# Patient Record
Sex: MALE | Race: ASIAN | NOT HISPANIC OR LATINO | Employment: STUDENT | URBAN - METROPOLITAN AREA
[De-identification: names, ages, dates, MRNs, and addresses within clinical notes are randomized per-mention and may not be internally consistent; named-entity substitution may affect disease eponyms.]

---

## 2024-03-29 ENCOUNTER — OFFICE VISIT (OUTPATIENT)
Dept: URGENT CARE | Facility: PHYSICIAN GROUP | Age: 19
End: 2024-03-29
Payer: COMMERCIAL

## 2024-03-29 ENCOUNTER — APPOINTMENT (OUTPATIENT)
Dept: URGENT CARE | Facility: PHYSICIAN GROUP | Age: 19
End: 2024-03-29

## 2024-03-29 VITALS
WEIGHT: 146.16 LBS | HEIGHT: 71 IN | OXYGEN SATURATION: 97 % | HEART RATE: 85 BPM | BODY MASS INDEX: 20.46 KG/M2 | DIASTOLIC BLOOD PRESSURE: 74 MMHG | TEMPERATURE: 99.6 F | RESPIRATION RATE: 16 BRPM | SYSTOLIC BLOOD PRESSURE: 106 MMHG

## 2024-03-29 DIAGNOSIS — J06.9 UPPER RESPIRATORY TRACT INFECTION, UNSPECIFIED TYPE: ICD-10-CM

## 2024-03-29 RX ORDER — ESCITALOPRAM OXALATE 10 MG/1
10 TABLET ORAL DAILY
COMMUNITY
Start: 2024-03-07

## 2024-03-29 RX ORDER — ONDANSETRON 4 MG/1
4 TABLET, ORALLY DISINTEGRATING ORAL EVERY 6 HOURS PRN
Qty: 15 TABLET | Refills: 0 | Status: SHIPPED | OUTPATIENT
Start: 2024-03-29

## 2024-03-29 ASSESSMENT — ENCOUNTER SYMPTOMS
SHORTNESS OF BREATH: 1
MYALGIAS: 0
FEVER: 0
SORE THROAT: 1
VOMITING: 0
DIARRHEA: 0
SINUS PAIN: 0
NAUSEA: 1
COUGH: 1
ABDOMINAL PAIN: 0
CHILLS: 0
WHEEZING: 0
HEADACHES: 1
SPUTUM PRODUCTION: 1

## 2024-03-29 NOTE — LETTER
[facility] U[facility] [address] [address]     March 29, 2024    Patient: Clyde Mcdowell   YOB: 2005   Date of Visit: 3/29/2024       To Whom It May Concern:    Clyde Mcdowell was seen and treated in our department on 3/29/2024. Please excuse Patient for recent illness that began 3/25/2024. Patient able to return to work if symptoms are improving and no fevers for 24 hours.     Sincerely,     GIANNA Plata.

## 2024-03-29 NOTE — PROGRESS NOTES
"Subjective:   Clyde Mcdowell is a 18 y.o. male who presents for Headache (Fever ,congestion ,sore throat ,x 4 days, need work note )      URI   This is a new problem. Episode onset: x4 days. The problem has been gradually improving. There has been no fever. Associated symptoms include congestion, coughing, headaches, nausea and a sore throat. Pertinent negatives include no abdominal pain, chest pain, diarrhea, dysuria, ear pain, rash, sinus pain, vomiting or wheezing.       Review of Systems   Constitutional:  Negative for chills, fever and malaise/fatigue.   HENT:  Positive for congestion and sore throat. Negative for ear pain, hearing loss and sinus pain.    Respiratory:  Positive for cough, sputum production and shortness of breath. Negative for wheezing.    Cardiovascular:  Negative for chest pain.   Gastrointestinal:  Positive for nausea. Negative for abdominal pain, diarrhea and vomiting.   Genitourinary:  Negative for dysuria.   Musculoskeletal:  Negative for myalgias.   Skin:  Negative for rash.   Neurological:  Positive for headaches.       Medications, Allergies, and current problem list reviewed today in Epic.     Objective:     /74   Pulse 85   Temp 37.6 °C (99.6 °F) (Temporal)   Resp 16   Ht 1.791 m (5' 10.5\")   Wt 66.3 kg (146 lb 2.6 oz)   SpO2 97%     Physical Exam  Vitals reviewed.   Constitutional:       General: He is not in acute distress.     Appearance: Normal appearance. He is not ill-appearing.   HENT:      Head: Normocephalic and atraumatic.      Right Ear: Tympanic membrane, ear canal and external ear normal.      Left Ear: Tympanic membrane, ear canal and external ear normal.      Nose: Congestion and rhinorrhea present.      Mouth/Throat:      Mouth: Mucous membranes are moist.      Pharynx: Oropharynx is clear. Posterior oropharyngeal erythema present. No oropharyngeal exudate.   Eyes:      Conjunctiva/sclera: Conjunctivae normal.      Pupils: Pupils are " equal, round, and reactive to light.   Cardiovascular:      Rate and Rhythm: Normal rate.      Heart sounds: Normal heart sounds.   Pulmonary:      Effort: Pulmonary effort is normal. No respiratory distress.      Breath sounds: Normal breath sounds. No wheezing.   Abdominal:      General: Abdomen is flat.      Palpations: Abdomen is soft.      Tenderness: There is no abdominal tenderness.   Musculoskeletal:      Cervical back: No tenderness.   Lymphadenopathy:      Cervical: No cervical adenopathy.   Skin:     General: Skin is warm and dry.      Capillary Refill: Capillary refill takes less than 2 seconds.   Neurological:      Mental Status: He is alert and oriented to person, place, and time.   Psychiatric:         Mood and Affect: Mood normal.         Behavior: Behavior normal.         Assessment/Plan:       1. Upper respiratory tract infection, unspecified type  ondansetron (ZOFRAN ODT) 4 MG TABLET DISPERSIBLE      Patient is a pleasant 18-year-old male who is here today with complaints of 4 days of nasal congestion, cough, sore throat, headache, and bodyaches.  Patient reports that symptoms are improving today.  Patient's does report that he feels mildly short of breath and has had some productive sputum.  Patient has not had any recent fevers but does still have body aches.  Patient reports that he did has mild nausea with no vomiting.  Patient has no diarrhea.  Patient does report generalized abdominal discomfort.  Patient reports that due to the symptoms he has been out of work this entire week.  Patient has no significant medical history.  Patient has questions about possible Z-Jeremy for treatment.  After ROS and physical exam patient appears in no acute distress.  Patient has nasal congestion with swollen bilateral nasal turbinates.  Patient has mild erythema to throat with no exudate.  Patient has no sinus tenderness.  Patient has no cervical adenopathy or tenderness to neck.  Patient has no abdominal pain  with palpation.  Patient's lung sounds are clear throughout all fields with the auscultation.  After discussion with patient I assume that patient has a URI and with symptoms improving instructed patient that a Z-Jeremy would not be appropriate at this time.  Patient was prescribed Zofran for nausea.  Patient told to take as prescribed.  Patient provided work note.  Patient to monitor for any worsening signs and symptoms and any other concerns to return to urgent care or emergency room for further management.  Follow-up    Differential diagnosis, natural history, and supportive care discussed. We also reviewed side effects of medication including allergic response, GI upset, tendon injury, rash, sedation etc. Patient and/or guardian voices understanding.      Advised the patient to follow-up with the primary care physician for recheck, reevaluation, and consideration of further management.    I personally reviewed prior external notes and test results pertinent to today's visit as well as additional imaging and testing completed in clinic today.     Please note that this dictation was created using voice recognition software. I have made every reasonable attempt to correct obvious errors, but I expect that there are errors of grammar and possibly content that I did not discover before finalizing the note.    This note was electronically signed by JOIE Martin

## 2025-08-14 ENCOUNTER — OFFICE VISIT (OUTPATIENT)
Dept: URGENT CARE | Facility: PHYSICIAN GROUP | Age: 20
End: 2025-08-14
Payer: COMMERCIAL

## 2025-08-14 VITALS
HEART RATE: 93 BPM | DIASTOLIC BLOOD PRESSURE: 70 MMHG | OXYGEN SATURATION: 98 % | BODY MASS INDEX: 20.3 KG/M2 | HEIGHT: 71 IN | TEMPERATURE: 99.4 F | WEIGHT: 145 LBS | RESPIRATION RATE: 16 BRPM | SYSTOLIC BLOOD PRESSURE: 108 MMHG

## 2025-08-14 DIAGNOSIS — R11.2 NAUSEA AND VOMITING, UNSPECIFIED VOMITING TYPE: ICD-10-CM

## 2025-08-14 DIAGNOSIS — J02.9 PHARYNGITIS, UNSPECIFIED ETIOLOGY: Primary | ICD-10-CM

## 2025-08-14 DIAGNOSIS — H66.002 NON-RECURRENT ACUTE SUPPURATIVE OTITIS MEDIA OF LEFT EAR WITHOUT SPONTANEOUS RUPTURE OF TYMPANIC MEMBRANE: ICD-10-CM

## 2025-08-14 LAB
FLUAV RNA SPEC QL NAA+PROBE: NEGATIVE
FLUBV RNA SPEC QL NAA+PROBE: NEGATIVE
RSV RNA SPEC QL NAA+PROBE: NEGATIVE
SARS-COV-2 RNA RESP QL NAA+PROBE: NEGATIVE

## 2025-08-14 PROCEDURE — 87637 SARSCOV2&INF A&B&RSV AMP PRB: CPT

## 2025-08-14 PROCEDURE — 3078F DIAST BP <80 MM HG: CPT

## 2025-08-14 PROCEDURE — 3074F SYST BP LT 130 MM HG: CPT

## 2025-08-14 PROCEDURE — 99213 OFFICE O/P EST LOW 20 MIN: CPT

## 2025-08-14 RX ORDER — ONDANSETRON 4 MG/1
4 TABLET, FILM COATED ORAL EVERY 4 HOURS PRN
Qty: 20 TABLET | Refills: 0 | Status: SHIPPED | OUTPATIENT
Start: 2025-08-14

## 2025-08-14 ASSESSMENT — ENCOUNTER SYMPTOMS
WEAKNESS: 0
NAUSEA: 1
SORE THROAT: 1
SINUS PAIN: 1
DIARRHEA: 0
VOMITING: 1
COUGH: 0
DIZZINESS: 0
SHORTNESS OF BREATH: 0
FEVER: 0
HEADACHES: 1